# Patient Record
Sex: FEMALE | Race: WHITE | NOT HISPANIC OR LATINO | Employment: FULL TIME | URBAN - METROPOLITAN AREA
[De-identification: names, ages, dates, MRNs, and addresses within clinical notes are randomized per-mention and may not be internally consistent; named-entity substitution may affect disease eponyms.]

---

## 2018-04-16 ENCOUNTER — TRANSCRIBE ORDERS (OUTPATIENT)
Dept: ADMINISTRATIVE | Facility: HOSPITAL | Age: 38
End: 2018-04-16

## 2018-04-16 DIAGNOSIS — Z12.39 SCREENING BREAST EXAMINATION: ICD-10-CM

## 2018-04-16 DIAGNOSIS — Z12.31 ENCOUNTER FOR MAMMOGRAM TO ESTABLISH BASELINE MAMMOGRAM: ICD-10-CM

## 2018-04-16 DIAGNOSIS — Q77.7 SPONDYLOEPIPHYSEAL DYSPLASIA CONGENITA: Primary | ICD-10-CM

## 2018-05-03 ENCOUNTER — HOSPITAL ENCOUNTER (OUTPATIENT)
Dept: BONE DENSITY | Facility: HOSPITAL | Age: 38
Discharge: HOME | End: 2018-05-03

## 2018-05-03 ENCOUNTER — HOSPITAL ENCOUNTER (OUTPATIENT)
Dept: MAMMOGRAPHY | Facility: HOSPITAL | Age: 38
Discharge: HOME | End: 2018-05-03
Admitting: NURSE PRACTITIONER

## 2018-05-03 DIAGNOSIS — Q77.7 SPONDYLOEPIPHYSEAL DYSPLASIA CONGENITA: ICD-10-CM

## 2018-05-03 DIAGNOSIS — Z12.31 ENCOUNTER FOR MAMMOGRAM TO ESTABLISH BASELINE MAMMOGRAM: ICD-10-CM

## 2018-05-03 PROCEDURE — 77067 SCR MAMMO BI INCL CAD: CPT

## 2018-05-03 PROCEDURE — 77080 DXA BONE DENSITY AXIAL: CPT

## 2018-08-22 ENCOUNTER — OFFICE VISIT (OUTPATIENT)
Dept: OBSTETRICS AND GYNECOLOGY | Age: 38
End: 2018-08-22

## 2018-08-22 VITALS — DIASTOLIC BLOOD PRESSURE: 70 MMHG | SYSTOLIC BLOOD PRESSURE: 100 MMHG | WEIGHT: 88 LBS

## 2018-08-22 DIAGNOSIS — R87.610 ASCUS WITH POSITIVE HIGH RISK HPV CERVICAL: Primary | ICD-10-CM

## 2018-08-22 DIAGNOSIS — F17.200 TOBACCO DEPENDENCE SYNDROME: ICD-10-CM

## 2018-08-22 DIAGNOSIS — R87.810 ASCUS WITH POSITIVE HIGH RISK HPV CERVICAL: Primary | ICD-10-CM

## 2018-08-22 PROBLEM — E78.2 MIXED HYPERLIPIDEMIA: Status: ACTIVE | Noted: 2018-07-17

## 2018-08-22 PROBLEM — M41.9 SCOLIOSIS OF THORACIC SPINE: Status: ACTIVE | Noted: 2018-04-16

## 2018-08-22 PROBLEM — G89.4 CHRONIC PAIN DISORDER: Status: ACTIVE | Noted: 2018-04-16

## 2018-08-22 PROBLEM — G43.009 MIGRAINE WITHOUT AURA: Status: ACTIVE | Noted: 2018-04-16

## 2018-08-22 PROBLEM — R53.83 FATIGUE: Status: ACTIVE | Noted: 2018-04-16

## 2018-08-22 PROBLEM — M85.80 OSTEOPENIA: Status: ACTIVE | Noted: 2018-05-06

## 2018-08-22 PROBLEM — F32.A DEPRESSIVE DISORDER: Status: ACTIVE | Noted: 2018-04-16

## 2018-08-22 PROBLEM — Q77.7: Status: ACTIVE | Noted: 2018-04-16

## 2018-08-22 PROBLEM — R87.619 ABNORMAL CERVICAL PAPANICOLAOU SMEAR: Status: ACTIVE | Noted: 2018-07-19

## 2018-08-22 PROBLEM — F41.9 ANXIETY: Status: ACTIVE | Noted: 2018-04-16

## 2018-08-22 PROBLEM — E55.9 VITAMIN D DEFICIENCY: Status: ACTIVE | Noted: 2018-04-16

## 2018-08-22 PROCEDURE — 57454 BX/CURETT OF CERVIX W/SCOPE: CPT | Performed by: OBSTETRICS & GYNECOLOGY

## 2018-08-22 PROCEDURE — 99406 BEHAV CHNG SMOKING 3-10 MIN: CPT | Performed by: OBSTETRICS & GYNECOLOGY

## 2018-08-22 PROCEDURE — 99203 OFFICE O/P NEW LOW 30 MIN: CPT | Performed by: OBSTETRICS & GYNECOLOGY

## 2018-08-22 RX ORDER — IBUPROFEN 800 MG/1
TABLET ORAL
COMMUNITY

## 2018-08-22 RX ORDER — BUTALBITAL, ACETAMINOPHEN, CAFFEINE AND CODEINE PHOSPHATE 50; 325; 40; 30 MG/1; MG/1; MG/1; MG/1
CAPSULE ORAL
COMMUNITY

## 2018-08-22 RX ORDER — ERGOCALCIFEROL 1.25 MG/1
CAPSULE ORAL
COMMUNITY
Start: 2018-04-20

## 2018-08-22 RX ORDER — HYDROCODONE BITARTRATE AND ACETAMINOPHEN 10; 325 MG/1; MG/1
TABLET ORAL
COMMUNITY
Start: 2018-05-14

## 2018-08-22 RX ORDER — TIZANIDINE 4 MG/1
TABLET ORAL
COMMUNITY

## 2018-08-22 NOTE — PATIENT INSTRUCTIONS
Take ibuprofen for cramping if needed    If you have bleeding like a period please call the office or come to the ER.     If you have pelvic pain, fever please go to the ER.    Will call with results of biopsy

## 2018-08-22 NOTE — PROGRESS NOTES
Procedure   Colposcopy  Date/Time: 8/22/2018 2:20 PM  Performed by: FINN LUTHER  Authorized by: FINN LUTHER   Local anesthesia used: no    Anesthesia:  Local anesthesia used: no    Sedation:  Patient sedated: no  Patient tolerance: Patient tolerated the procedure well with no immediate complications  Comments: PROCEDURE NOTE   Gracie Whiting is being see for a Diagnosis of  ASCUS with POSITIVE high risk HPV    Patient's last menstrual period was 08/16/2018 (exact date).  Menopause no  Pregnant no  Gardasil not completed    COLPOSCOPIC PROCEDURE    5% Acetic Acid was used to prepare the cervix for examination. Lugol's solution was also applied. The squamocolumnar junction was not seen, likely due to prior history of LEEP. Because of this an ECC was taken however unable to completely do so with Kavorkian curette due to cervical stenosis and so brush was used. Cervical biopsies were taken at 9 o'clock. Hemostasis was obtained with Silver Nitrate applications.    The total number of biopsies were 1.   The total  number of specimen containers were 2  The procedure was well tolerated. Instructions on vaginal rest and possibly bleeding were discussed as well as follow up for results.    Finn Luther MD  8/22/2018  2:24 PM

## 2018-08-22 NOTE — PROGRESS NOTES
Subjective     Chief Complaint   Patient presents with   • Follow-up     PT HERE AS A F/U FROM HER PCP FOR ABNORMAL PAP. PT STATES IT IS ABNORMAL WITH +HPV. DID NOT BRING RECORDS.       Gracie Whiting is a 38 y.o.  whose LMP is Patient's last menstrual period was 2018 (exact date). presents with abnormal pap, ASCUS + HPV. Long-standing tobacco use and long hx of abnormal paps. Had a LEEP about five years ago with Dr. Anderson and now has had this abnormal pap about a  Year ago. Worried about HPV, concerned she has no symptoms.      No Additional Complaints Reported    The following portions of the patient's history were reviewed and updated as appropriate:vital signs, allergies, current medications, past medical history, past social history, past surgical history and problem list, family history    Review of Systems   Constitutional: Positive for appetite change and fatigue. Negative for unexpected weight change.   Gastrointestinal: Negative for abdominal pain.   Genitourinary: Negative for menstrual problem, pelvic pain, vaginal bleeding and vaginal discharge.       Objective      /70   Wt 39.9 kg (88 lb)   LMP 2018 (Exact Date)     Physical Exam   Constitutional: She is oriented to person, place, and time. She appears well-developed and well-nourished.   Very short in stature   Eyes: Pupils are equal, round, and reactive to light. EOM are normal.   Pulmonary/Chest: Effort normal. No respiratory distress.   Convexity of chest wall   Genitourinary: Vagina normal and uterus normal.   Neurological: She is alert and oriented to person, place, and time.   Skin: Skin is warm and dry.   Psychiatric: She has a normal mood and affect.   anxious       Labs: reviewed pap report    Assessment/Plan     1. ASCUS with positive high risk HPV cervical    2. Tobacco dependence syndrome        PLAN    Problems Addressed this Visit     ASCUS with positive high risk HPV cervical - Primary     Colposcopy w  biopsy and ECC today         Relevant Orders    Reference Histopathology    Reference Histopathology    Tobacco dependence syndrome     Discussed the relevance of smoking and persistent of HPV, abnormal paps, increased risk of cervical cancer. I counseled her for approximately 3 min 40 seconds today regarding reason to quit and methods to help stop smoking and she is not interested at this time, sites stress-related issues.               Follow up: depending on results of bx/ECC    Shena Luther MD  8/22/2018

## 2018-08-22 NOTE — ASSESSMENT & PLAN NOTE
Discussed the relevance of smoking and persistent of HPV, abnormal paps, increased risk of cervical cancer. I counseled her for approximately 3 min 40 seconds today regarding reason to quit and methods to help stop smoking and she is not interested at this time, sites stress-related issues.

## 2018-08-27 LAB
DX ICD CODE: NORMAL
DX ICD CODE: NORMAL
PATH REPORT.FINAL DX SPEC: NORMAL
PATH REPORT.GROSS SPEC: NORMAL
PATH REPORT.RELEVANT HX SPEC: NORMAL
PATH REPORT.SITE OF ORIGIN SPEC: NORMAL
PATHOLOGIST NAME: NORMAL
PAYMENT PROCEDURE: NORMAL

## 2018-09-04 ENCOUNTER — TELEPHONE (OUTPATIENT)
Dept: OBSTETRICS AND GYNECOLOGY | Age: 38
End: 2018-09-04

## 2018-09-04 NOTE — TELEPHONE ENCOUNTER
Called pt to follow up on colpo results. 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 Needs to have repeat colpo w slight dilation and better ECC    Shena Luther MD  9/4/2018  1:20 PM

## 2018-09-12 ENCOUNTER — TELEPHONE (OUTPATIENT)
Dept: OBSTETRICS AND GYNECOLOGY | Age: 38
End: 2018-09-12

## 2018-09-12 NOTE — TELEPHONE ENCOUNTER
Attempted to call patient to see if she would like some anxiety medication prior to her colposcopy, left VM to call back. If would like, will send in an alprazolam and have someone drive her to the appt    Shena Luther MD  9/12/2018  4:03 PM

## 2018-09-25 ENCOUNTER — PROCEDURE VISIT (OUTPATIENT)
Dept: OBSTETRICS AND GYNECOLOGY | Age: 38
End: 2018-09-25

## 2018-09-25 VITALS
WEIGHT: 87 LBS | DIASTOLIC BLOOD PRESSURE: 60 MMHG | SYSTOLIC BLOOD PRESSURE: 100 MMHG | HEIGHT: 55 IN | BODY MASS INDEX: 20.13 KG/M2

## 2018-09-25 DIAGNOSIS — R87.810 ASCUS WITH POSITIVE HIGH RISK HPV CERVICAL: Primary | ICD-10-CM

## 2018-09-25 DIAGNOSIS — R87.619 ABNORMAL CERVICAL PAPANICOLAOU SMEAR, UNSPECIFIED ABNORMAL PAP FINDING: ICD-10-CM

## 2018-09-25 DIAGNOSIS — R87.610 ASCUS WITH POSITIVE HIGH RISK HPV CERVICAL: Primary | ICD-10-CM

## 2018-09-25 LAB
B-HCG UR QL: NEGATIVE
INTERNAL NEGATIVE CONTROL: NEGATIVE
INTERNAL POSITIVE CONTROL: POSITIVE
Lab: NORMAL

## 2018-09-25 PROCEDURE — 81025 URINE PREGNANCY TEST: CPT | Performed by: OBSTETRICS & GYNECOLOGY

## 2018-09-25 PROCEDURE — 57505 ENDOCERVICAL CURETTAGE: CPT | Performed by: OBSTETRICS & GYNECOLOGY

## 2018-09-25 NOTE — PROGRESS NOTES
Procedure   Procedures        PROCEDURE NOTE   Gracie Whiting is being see for a Diagnosis of  ACUS + high risk HPV. Had prior colposcopy with biopsy showing koilocytosis, no ABHI, however the colposcopy was inadequate and also the ECC was inadeqaute. This was likely due to some cervical stenosis. She returns today for cervical dilation and endocervical currettage.     Patient's last menstrual period was 09/14/2018. Menopause no  Pregnant no. Does use tobacco and have counseled regarding cessation.    Cervical dilation and endocervical curettage    A sterile speculum was used to visualize the cervix. The anterior lip of the cervix was grasped with a single tooth tenaculum. A graduated plastic cervical dilation was used to gently open the stenotic cervix. An endocervical curettage was then performed with a Kevorkian curette and the remaining tissue collected with a cytobrush. The tenaculum was then removed and the sites were hemostatic. The speculum was removed.    The prior biopsy site was well-healed    The total number of biopsies were 1.   The total  number of specimen containers were 1  The procedure was well tolerated. Instructions on vaginal rest and possibly bleeding were discussed as well as follow up for results.    Discussed contraception today, is not sexually active and does not plan to become sexually active. I discussed with her that her pregnancy would be complicated, would recommend contraception. Not interested at this time, will return if desires contraception.    Shena Luther MD  9/25/2018  1:29 PM

## 2018-09-27 LAB
DX ICD CODE: NORMAL
DX ICD CODE: NORMAL
PATH REPORT.FINAL DX SPEC: NORMAL
PATH REPORT.GROSS SPEC: NORMAL
PATH REPORT.SITE OF ORIGIN SPEC: NORMAL
PATHOLOGIST NAME: NORMAL
PAYMENT PROCEDURE: NORMAL

## 2018-09-28 ENCOUNTER — TELEPHONE (OUTPATIENT)
Dept: OBSTETRICS AND GYNECOLOGY | Age: 38
End: 2018-09-28

## 2021-04-02 ENCOUNTER — BULK ORDERING (OUTPATIENT)
Dept: CASE MANAGEMENT | Facility: OTHER | Age: 41
End: 2021-04-02

## 2021-04-02 DIAGNOSIS — Z23 IMMUNIZATION DUE: ICD-10-CM
